# Patient Record
Sex: FEMALE
[De-identification: names, ages, dates, MRNs, and addresses within clinical notes are randomized per-mention and may not be internally consistent; named-entity substitution may affect disease eponyms.]

---

## 2018-05-16 ENCOUNTER — HOSPITAL ENCOUNTER (EMERGENCY)
Dept: HOSPITAL 31 - C.ER | Age: 28
Discharge: HOME | End: 2018-05-16
Payer: SELF-PAY

## 2018-05-16 VITALS
HEART RATE: 90 BPM | SYSTOLIC BLOOD PRESSURE: 122 MMHG | TEMPERATURE: 98.5 F | RESPIRATION RATE: 16 BRPM | DIASTOLIC BLOOD PRESSURE: 85 MMHG

## 2018-05-16 VITALS — OXYGEN SATURATION: 100 %
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
## 2018-05-16 DIAGNOSIS — M79.1: ICD-10-CM

## 2018-05-16 DIAGNOSIS — S09.90XA: Primary | ICD-10-CM
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
## 2018-05-16 NOTE — RAD
PROCEDURE:  Right Ankle Radiographs.



HISTORY:

Unspecified injury



COMPARISON:

None



FINDINGS:



BONES:

Normal. No fracture. 



JOINTS:

Normal. No osteoarthritis. Ankle mortise maintained. Talar dome intact



SOFT TISSUES:

Normal. 



OTHER FINDINGS:

None.



IMPRESSION:

Normal right ankle radiographs.

## 2018-05-16 NOTE — RAD
PROCEDURE:  Right hip



HISTORY:

fall, hip pain



COMPARISON:

None



TECHNIQUE:

Standard protocol for this study/examination.



FINDINGS:

There are no osseous abnormalities to suggest fracture. The pelvic 

ring is intact. Preserved femoral-acetabular relationship. Negative 

study for protrusio, subluxation or dislocation.  Degenerative 

changes: None. 



IMPRESSION:

Negative study

## 2018-05-16 NOTE — CT
PROCEDURE:  CT HEAD WITHOUT CONTRAST.



HISTORY:

R/O Bleed



COMPARISON:

None available. 



TECHNIQUE:

Axial computed tomography images were obtained through the head/brain 

without intravenous contrast.  



Radiation dose:



Total exam DLP = 786.12 mGy-cm.



This CT exam was performed using one or more of the following dose 

reduction techniques: Automated exposure control, adjustment of the 

mA and/or kV according to patient size, and/or use of iterative 

reconstruction technique.



FINDINGS:



HEMORRHAGE:

No intracranial hemorrhage. 



BRAIN:

No mass effect or edema.  No atrophy or chronic microvascular 

ischemic changes.



VENTRICLES:

Unremarkable. No hydrocephalus. 



CALVARIUM:

Unremarkable.



PARANASAL SINUSES:

Minimal chronic ethmoid, sphenoid and bilateral maxillary sinusitis.



MASTOID AIR CELLS:

Unremarkable as visualized. No inflammatory changes.



OTHER FINDINGS:

None.



IMPRESSION:

Minimal chronic paranasal sinusitis.  Otherwise unremarkable 

examination. No acute intracranial hemorrhage.

## 2018-05-16 NOTE — C.PDOC
History Of Present Illness


28 year old female presents to the emergency department status-post tripping 

and falling yesterday. Patient reports she was walking along and suddenly 

tripped and fell, and she doesn't remember how. Patient states she does not 

remember putting her hands out, stating she fell on her right side only. 

Patient remembers hitting her head with no loss of consciousness. Patient 

reports she was able to get up and walk. Last night, the patient reports she 

started getting a headache, and when she woke up her headache was worse. 

Patient states she was nauseated and vomited, feels dizzy and currently has 

pain in her right hip and ankle. Patient denies neck pain or any other 

symptoms. 





- HPI


Time Seen by Provider: 05/16/18 16:21


Chief Complaint (Nursing): Trauma


History Per: Patient


History/Exam Limitations: no limitations


Onset/Duration Of Symptoms: Days (1)


Location Of Injury: Right: Head, Leg





- Fall


Fall:Prior To Injury: Tripped





Past Medical History


Reviewed: Historical Data, Nursing Documentation, Vital Signs


Vital Signs: 


 Last Vital Signs











Temp  97.6 F   05/16/18 15:33


 


Pulse  104 H  05/16/18 15:33


 


Resp  17   05/16/18 15:33


 


BP  138/87   05/16/18 15:33


 


Pulse Ox  100   05/16/18 17:29














- Medical History


PMH: No Chronic Diseases


Surgical History: No Surg Hx


Family History: States: No Known Family Hx





- Social History


Hx Alcohol Use: No


Hx Substance Use: No





- Immunization History


Hx Tetanus Toxoid Vaccination: No


Hx Influenza Vaccination: No


Hx Pneumococcal Vaccination: No





Review Of Systems


Constitutional: Negative for: Weakness


Gastrointestinal: Positive for: Nausea, Vomiting


Musculoskeletal: Positive for: Leg Pain (right hip and ankle).  Negative for: 

Neck Pain


Neurological: Positive for: Headache, Dizziness





Physical Exam





- Physical Exam


Appears: Non-toxic, No Acute Distress


Skin: Normal Color, Warm, No Ecchymosis (on hands or knees)


Head: No Atraumatic, Other (red spot on right forehead)


Neck: Normal ROM, Trachea Midline, No Midline Cervical Tenderness, No 

Paracervical Tenderness, Supple, No Other (stiffness)


Extremity: Other (actively resists lower extremity exam, is not cooperative)


Neurological/Psych: Oriented x3, Normal Speech, Normal Cognition, Normal Motor, 

Normal Sensation, Normal Reflexes





ED Course And Treatment


O2 Sat by Pulse Oximetry: 100 (RA)


Pulse Ox Interpretation: Normal





- Other Rad


  ** Right hip and ankle


X-Ray: Interpreted by Me


Interpretation: No acute fracture or dislocation. No soft tissue swelling.





- CT Scan/US


  ** Head


Other Rad Studies (CT/US): Read By Radiologist, Radiology Report Reviewed


CT/US Interpretation: PROCEDURE:  CT HEAD WITHOUT CONTRAST.  HISTORY:  R/O 

Bleed.  COMPARISON:  None available.  TECHNIQUE:  Axial computed tomography 

images were obtained through the head/brain without intravenous contrast.  

Radiation dose:  Total exam DLP = 786.12 mGy-cm.  This CT exam was performed 

using one or more of the following dose reduction techniques: Automated 

exposure control, adjustment of the mA and/or kV according to patient size, and/

or use of iterative reconstruction technique.  FINDINGS:  HEMORRHAGE:  No 

intracranial hemorrhage.  BRAIN:  No mass effect or edema.  No atrophy or 

chronic microvascular ischemic changes.  VENTRICLES:  Unremarkable. No 

hydrocephalus.  CALVARIUM:  Unremarkable.  PARANASAL SINUSES:  Minimal chronic 

ethmoid, sphenoid and bilateral maxillary sinusitis.  MASTOID AIR CELLS:  

Unremarkable as visualized. No inflammatory changes.  OTHER FINDINGS:  None.  

IMPRESSION:  Minimal chronic paranasal sinusitis.  Otherwise unremarkable 

examination. No acute intracranial hemorrhage.


Progress Note: Plan:  CT Head w/o Contrast.  XR Right Hip.  Urinalysis





Disposition


Counseled Patient/Family Regarding: Studies Performed, Diagnosis, Need For 

Followup, Rx Given





- Disposition


Referrals: 


[facility] [Outside]


Disposition: HOME/ ROUTINE


Disposition Time: 18:03


Condition: STABLE


Additional Instructions: 


Take Advil or Tylenol if needed for pain.


Prescriptions: 


Cyclobenzaprine [Cyclobenzaprine HCl] 10 mg PO BID PRN #14 tab


 PRN Reason: Pain, Severe (8-10)


Instructions:  Minor Head Injury


Forms:  [employer] (English)





- Clinical Impression


Clinical Impression: 


 Head injury, Musculoskeletal pain of extremity








- Scribe Statement


The provider has reviewed the documentation as recorded by the Scribe (Ulices dewitt)


Provider Attestation: 


All medical record entries made by the Scribe were at my direction and 

personally dictated by me. I have reviewed the chart and agree that the record 

accurately reflects my personal performance of the history, physical exam, 

medical decision making, and the department course for this patient. I have 

also personally directed, reviewed, and agree with the discharge instructions 

and disposition.